# Patient Record
Sex: MALE | Race: WHITE | NOT HISPANIC OR LATINO | Employment: PART TIME | URBAN - METROPOLITAN AREA
[De-identification: names, ages, dates, MRNs, and addresses within clinical notes are randomized per-mention and may not be internally consistent; named-entity substitution may affect disease eponyms.]

---

## 2023-05-06 ENCOUNTER — HOSPITAL ENCOUNTER (EMERGENCY)
Facility: HOSPITAL | Age: 23
Discharge: HOME | End: 2023-05-06
Attending: EMERGENCY MEDICINE | Admitting: EMERGENCY MEDICINE

## 2023-05-06 VITALS
HEART RATE: 91 BPM | RESPIRATION RATE: 20 BRPM | OXYGEN SATURATION: 98 % | SYSTOLIC BLOOD PRESSURE: 132 MMHG | TEMPERATURE: 98 F | DIASTOLIC BLOOD PRESSURE: 84 MMHG

## 2023-05-06 DIAGNOSIS — F10.929 ALCOHOL INTOXICATION (HCC): Primary | ICD-10-CM

## 2023-05-06 LAB — ETHANOL EXG-MCNC: 0.12 MG/DL

## 2023-05-06 NOTE — ED PROVIDER NOTES
"History  Chief Complaint   Patient presents with   • Alcohol Intoxication     Pt arrived via ems with c/o alcohol intoxication  Oneal Proctor is a pleasant 70-year-old male who is here in the emergency department for apparent evaluation for alcohol intoxication  Patient states that he was in fact drinking most of the evening  He states that his grandfather dropped him off just outside of town in the [facility]  The patient then frequented a number of establishments and was drinking alcohol most of the evening  He denies any falls or trauma  He denies any \"blackout\"  Throughout the evening  Patient is unsure exactly why he is here  He states that he was intoxicated and was trying to \"sleep it off  \"  Per EMS report patient apparently was trying to sleep in a public place and EMS was called due to his intoxication  There does not appear to have been any involvement by law enforcement and the patient does not appear to have been involved in any kind of physical trauma or altercation  He does appear somewhat intoxicated but is able to speak clearly and intelligibly, follow commands, etc       History provided by:  Patient  Alcohol Intoxication      None       History reviewed  No pertinent past medical history  History reviewed  No pertinent surgical history  History reviewed  No pertinent family history  I have reviewed and agree with the history as documented  E-Cigarette/Vaping     E-Cigarette/Vaping Substances     Social History     Tobacco Use   • Smoking status: Never   • Smokeless tobacco: Current   Substance Use Topics   • Alcohol use: Yes   • Drug use: Yes     Types: Marijuana       Review of Systems   All other systems reviewed and are negative  Physical Exam  Physical Exam  Vitals and nursing note reviewed  Constitutional:       General: He is not in acute distress  Appearance: He is well-developed  HENT:      Head: Normocephalic and atraumatic     Eyes:      Conjunctiva/sclera: " Conjunctivae normal    Cardiovascular:      Rate and Rhythm: Normal rate and regular rhythm  Heart sounds: No murmur heard  Pulmonary:      Effort: Pulmonary effort is normal  No respiratory distress  Breath sounds: Normal breath sounds  Abdominal:      Palpations: Abdomen is soft  Tenderness: There is no abdominal tenderness  Musculoskeletal:         General: No swelling  Cervical back: Neck supple  Skin:     General: Skin is warm and dry  Capillary Refill: Capillary refill takes less than 2 seconds  Neurological:      General: No focal deficit present  Mental Status: He is alert and oriented to person, place, and time  Cranial Nerves: No cranial nerve deficit  Sensory: No sensory deficit  Motor: No weakness  Coordination: Coordination normal       Comments: Does appear mildly intoxicated in keeping with patient's reported history of drinking most of the evening  His speech is clear and intelligible, GCS is 15 nonfocal, 5/5 strength in all 4 extremities, cranial nerves II through XII grossly intact, normal cerebellar testing  Psychiatric:         Mood and Affect: Mood normal          Vital Signs  ED Triage Vitals [05/06/23 0348]   Temperature Pulse Respirations Blood Pressure SpO2   98 °F (36 7 °C) 91 20 132/84 98 %      Temp Source Heart Rate Source Patient Position - Orthostatic VS BP Location FiO2 (%)   Oral Monitor Lying Right arm --      Pain Score       --           Vitals:    05/06/23 0348   BP: 132/84   Pulse: 91   Patient Position - Orthostatic VS: Lying         Visual Acuity      ED Medications  Medications - No data to display    Diagnostic Studies  Results Reviewed     None                 No orders to display              Procedures  Procedures         ED Course                                             Medical Decision Making  43-year-old otherwise healthy male brought in for public intoxication    He does appear somewhat intoxicated but has a reassuring, nonfocal neurologic exam   His vital signs are within normal limits  He has no complaints at this time  We will plan to discharge patient as soon as he can contact a sober transportation  Alcohol intoxication ([facility] [address] ): acute illness or injury      Disposition  Final diagnoses:   Alcohol intoxication ([facility] [address] )     Time reflects when diagnosis was documented in both MDM as applicable and the Disposition within this note     Time User Action Codes Description Comment    5/6/2023  3:58 AM Bimal Bolaños Add [F10 [id number]] Alcohol intoxication [facility])       ED Disposition     ED Disposition   Discharge    Condition   Stable    Date/Time   Sat May 6, 2023  3:58 AM    Comment   [address] discharge to home/self care  Follow-up Information     Follow up With Specialties Details Why [address], DO Family Medicine   [address]  [address]  [phone]            Patient's Medications    No medications on file       No discharge procedures on file      PDMP Review     None          ED Provider  Electronically Signed by           Barbie Mendoza MD  05/06/23 8402